# Patient Record
(demographics unavailable — no encounter records)

---

## 2025-03-24 NOTE — PHYSICAL EXAM
[General Appearance - Alert] : alert [General Appearance - In No Acute Distress] : in no acute distress [General Appearance - Well Nourished] : well nourished [General Appearance - Well Developed] : well developed [General Appearance - Well-Appearing] : healthy appearing [Sclera] : the sclera and conjunctiva were normal [] : no respiratory distress [Exaggerated Use Of Accessory Muscles For Inspiration] : no accessory muscle use [Edema] : there was no peripheral edema [Abdomen Soft] : soft [Abdomen Tenderness] : non-tender [Abdomen Mass (___ Cm)] : no abdominal mass palpated [Abnormal Walk] : normal gait [Skin Color & Pigmentation] : normal skin color and pigmentation [No Focal Deficits] : no focal deficits [Oriented To Time, Place, And Person] : oriented to person, place, and time [Impaired Insight] : insight and judgment were intact [Affect] : the affect was normal [Scleral Icterus] : No Scleral Icterus [Abdominal  Ascites] : no ascites [Asterixis] : no asterixis observed [Jaundice] : No jaundice [Depression] : no depression [Hallucinations] : ~T no ~M hallucinations

## 2025-03-24 NOTE — ASSESSMENT
[FreeTextEntry1] : Pt is a 49 year old male with PMH HTN, asthma, current smoker, HBcAb+, who presents for initial visit. Referred by his PCP to "rule out liver fibrosis."  #HBcAb+: - Patient's HBV serologies are consistent with cleared prior HBV infection (HBsAg neg, HBsAb positive). - LFTs within normal limits in 2/2025.  Plan: - FibroScan was performed in the office today.  Fibroscan =>  (S0), 3.4 kpa (F0-1), IQR 15%.  The FibroScan results were reviewed in detail the patient today -- he does not have any evidence of significant steatosis or fibrosis in his liver. - Counseled pt to avoid hepatotoxins, alcohol use, herbal supplements. Limit acetaminophen to 2g/day. - I advised patient that no medication for HBV is required currently; however, if he needs chemotherapy or immunosuppressant medications in the future, that he may need to take medication for prophylaxis against HBV reactivation at that time. - Patient was advised to continue follow-up with his PCP. - HAV nonimmune -- patient was advised that he can get HAV vaccination his PCP.  HCV antibody negative in 2/2025.  RTC PRN

## 2025-03-24 NOTE — HISTORY OF PRESENT ILLNESS
[FreeTextEntry1] : Pt is a 49 year old male with PMH HTN, asthma, current smoker, HBcAb+, who presents for initial visit. Referred by his PCP to "rule out liver fibrosis." PCP: Hannah Sauceda GI: [facility] Pharmacy: ***   PRIOR DATA: -2/2025: WBC 7.5, Hgb 16.6 (MCV 87), Plt 386. Na 135, K 4.6, Cr 0.8. AST/ALT 16/15, TB 0.6, , Alb 4.6, TP 7.3.  GARDENIA negative. A1c 4.5%. Vit B1 85.8 (wnl). Vit B12 438 (wnl). HAV Ab total negative (HAV nonimmune). HBcAb total positive, HBsAg negative, HBsAb positive (c/w clear prior HBV infection). HCV Ab negative.  HIV antigen/antibody negative. - 3/24/25: Fibroscan =>  (S0), 3.4 kpa (F0-1), IQR 15%.  Liver Hx: Age of diagnosis: 5 years ago, pt tested positive for HBcAb but HBsAg neg. LFTs were always normal as per pt.  A month ago, pt developed back pain and numbness in the legs when sleeping. PCP did a full panel and noticed the prior hep B exposure. Since pt drinks 4-5x/week, PCP advised pt to come here for fibrosis evaluation. Today pt reports feeling well.  Denies f/c, cp, sob, cough, abd pain, n/v/d/c, hematochezia/melena, dysuria/hematuria. Prior liver biopsy: never  Prior HE: never Prior GI bleed: never Weight hx: Peak lifetime weight 152# at age 47. Was skinny as a child and as a teen.   Family Hx: Denies family hx of cirrhosis, liver cancer, autoimmune disease, IBD, CRC or other primary GI malignancy. Dad with Ankylosing spondylitis Paternal aunt with HCV    Social Hx Tobacco: a pack of cigarettes a week x 20 years Alcohol: 2 drinks (beer or wine) a day, 4-5x/ week for 4 years. Prior to that he would drink 2-3x/week and 1-2 drinks each time. Drugs: maujianna smoking daily x 10 years Work: [employer] Lives with self Born in [facility] Education level: undergraduate degree   PSH: b/l inguinal hernia surgery as a baby   Last EGD: never Last COL: 9/9/2021: no polyp. Normal as per pt.   Allergies: NKDA   CURRENT MEDS: Amlodipine 10 mg daily Albuterol Finasteride 1 mg daily Claritin 10 mg daily Montelukast 10 mg daily MVI Denies use of herbal supplements.

## 2025-07-30 NOTE — DATA REVIEWED
[de-identified] : L-spine wo in 7/2009 @ [facility] showed minimal disc herniation @ L4-5 without nerve compresson

## 2025-07-30 NOTE — HISTORY OF PRESENT ILLNESS
[FreeTextEntry1] : neck and lower back pain with LLE numbness [de-identified] : 48 yo M with PMH of HTN reports lower back pain beginning approximately 13 years ago with associated MRI findings of herniated discs performed in 2009. The patient notes difficulty laying on their side due to neck discomfort. Over the last six months, symptoms have progressed to include numbness in the LEFT leg during sleep. The patient denies bowel or bladder dysfunction, foot drop, or perineal numbness. Previous attempts to address the condition, including physical therapy, pain management, and other conservative treatments, are reported as absent in the past six months.  He was evaluated by PCP and referred to neuro surgery for further evaluation

## 2025-07-30 NOTE — PHYSICAL EXAM
[General Appearance - Alert] : alert [General Appearance - In No Acute Distress] : in no acute distress [General Appearance - Well Nourished] : well nourished [General Appearance - Well-Appearing] : healthy appearing [Oriented To Time, Place, And Person] : oriented to person, place, and time [Impaired Insight] : insight and judgment were intact [Affect] : the affect was normal [Memory Recent] : recent memory was not impaired [Antalgic] : antalgic [Able to toe walk] : the patient was able to toe walk [Over the Past 2 Weeks, Have You Felt Down, Depressed, or Hopeless?] : 1.) Over the past 2 weeks, have you felt down, depressed, or hopeless? No [Over the Past 2 Weeks, Have You Felt Little Interest or Pleasure Doing Things?] : 2.) Over the past 2 weeks, have you felt little interest or pleasure doing things? No [Able to heel walk] : the patient was not able to heel walk

## 2025-07-30 NOTE — ASSESSMENT
[FreeTextEntry1] : Patient denies any medication trials given moderate symptoms and would like to obtain images.  PLAN - Xray C/L spine, scoliosis Xray  - MRI L-spine wo - PT/pain management referral - f/u after images to review with Dr. Ramirez

## 2025-07-30 NOTE — REASON FOR VISIT
[New Patient Visit] : a new patient visit [Home] : at home, [unfilled] , at the time of the visit. [Telehealth (audio & video)] : This visit was provided via telehealth using real-time 2-way audio visual technology. [Verbal consent obtained from patient] : the patient, [unfilled]